# Patient Record
(demographics unavailable — no encounter records)

---

## 2024-10-28 NOTE — CARDIOLOGY SUMMARY
[de-identified] : - Atrial fibrillation: Onset 9/26/2024, [id number] Vasc score 4, Holter monitor:  10/23/2024, 3-day, A-fib 87 () no symptoms, 1% PVCs - Abnormal ECG - Hypertension - Hypercholesterolemia: 10 year cardiac risk 18%, 9/11/2021 - Diabetes - Morbid obesity: BMI 41 - Family history of premature coronary artery disease: Father MI 51 years old - PHARMACOLOGIC NUCLEAR STRESS TEST: 10/8/2024, normal, EF 70%   - ECHOCARDIOGRAM:10/3/2024, EF 55%, trace mitral regurgitation and tricuspid regurgitation PA systolic 38, mild aortic stenosis, BAUTISTA 1.7 cm, V1 = 0.92, V2 = 1.7, mean gradient 10, peak gradient 16  - CAROTID ULTRASOUND: 10/3/2024, normal

## 2024-10-28 NOTE — REASON FOR VISIT
[FreeTextEntry1] : Telephone telemedicine visit  chief complaint: Preoperative cardiovascular evaluation

## 2024-10-28 NOTE — HISTORY OF PRESENT ILLNESS
[FreeTextEntry1] : The patient is a 70-year-old white female with a past medical history remarkable for hypertension, hypercholesterolemia, morbid obesity, diabetes, a family history of premature coronary artery disease, and carotid artery disease who presented for evaluation on September 26 prior to endoscopy/colonoscopy. The patient was found to be in new onset atrial fibrillation. Holter monitoring demonstrated a controlled ventricular response.  Echocardiography demonstrated a normal ejection fraction and mild aortic stenosis.  Nuclear stress testing was normal. The patient's activity is limited by knee discomfort.  She ambulates with a cane.  She denies chest pain, dyspnea, palpitations, or loss of consciousness.   A basic metabolic profile from October 6 was reviewed.  As noted below, a CBC and TFTs were ordered I reviewed the results with the patient.  A plan was developed.  Her questions were answered.  We spoke on the phone for 5 minutes.  She consented to a telephone telemedicine visit

## 2024-10-28 NOTE — DISCUSSION/SUMMARY
[FreeTextEntry1] : Atrial fibrillation New onset.  Asymptomatic, rate controlled. Continue anticoagulation to prevent embolic complications of atrial fibrillation.  TFTs ordered.    hypertension Controlled.  Continue present medical regimen  hypercholesterolemia/morbid obesity elevated triglycerides 8/2024.  We rediscussed diet, exercise, and weight loss at the patient's next visit  Preoperative cardiovascular evaluation: Endoscopy/colonoscopy In view of the patient's normal ejection fraction, normal stress test, adequate functional capacity, and the absence of unstable angina, congestive heart failure, or severe aortic stenosis, she is at average risk for perioperative cardiovascular morbidity and mortality, and cleared from a cardiology standpoint with the following recommendations: Perioperative beta-blockers, endocarditis prophylaxis, and a postoperative monitored bed are not indicated. Eliquis may be held 2 days prior to the procedure  RTO 3 months

## 2025-02-05 NOTE — PHYSICAL EXAM

## 2025-02-05 NOTE — DISCUSSION/SUMMARY
[FreeTextEntry1] : Atrial fibrillation New onset 9/2024.  Asymptomatic, rate controlled. Continue anticoagulation to prevent embolic complications of atrial fibrillation.  EP consultation ordered to determine if the patient would benefit from atrial fibrillation ablation/implantable loop recorder     hypertension Controlled.  Continue present medical regimen  hypercholesterolemia/morbid obesity Low HDL cholesterol and elevated triglycerides 8/2024.   We rediscussed diet, exercise, and weight loss at the patient's next visit  RTO 6 months

## 2025-02-05 NOTE — HISTORY OF PRESENT ILLNESS
[FreeTextEntry1] : The patient is a 70-year-old white female with a past medical history remarkable for atrial fibrillation, hypertension, hypercholesterolemia, morbid obesity, diabetes, a family history of premature coronary artery disease, and carotid artery disease. The patient's activity is limited by knee discomfort.  She ambulates with a cane.  She denies chest pain, dyspnea, palpitations, or loss of consciousness.   Lipids from 8/14/2024 demonstrated hypertriglyceridemia and low HDL cholesterol.

## 2025-02-05 NOTE — CARDIOLOGY SUMMARY
[de-identified] : - Atrial fibrillation: Onset 9/26/2024, [id number] Vasc score 4, Holter monitor:  10/23/2024, 3-day, A-fib 87 () no symptoms, 1% PVCs - Abnormal ECG - Hypertension - Hypercholesterolemia: 10 year cardiac risk 18%, 9/11/2021 - Diabetes - Morbid obesity: BMI 41 - Family history of premature coronary artery disease: Father MI 51 years old - PHARMACOLOGIC NUCLEAR STRESS TEST: 10/8/2024, normal, EF 70%   - ECHOCARDIOGRAM:10/3/2024, EF 55%, trace mitral regurgitation and tricuspid regurgitation PA systolic 38, mild aortic stenosis, BAUTISTA 1.7 cm, V1 = 0.92, V2 = 1.7, mean gradient 10, peak gradient 16  - CAROTID ULTRASOUND: 10/3/2024, normal

## 2025-03-23 NOTE — PHYSICAL EXAM

## 2025-03-23 NOTE — PHYSICAL EXAM
[Well Developed] : well developed [Well Nourished] : well nourished [No Acute Distress] : no acute distress [Normal Conjunctiva] : normal conjunctiva [Normal Venous Pressure] : normal venous pressure [No Carotid Bruit] : no carotid bruit [No Murmur] : no murmur [No Rub] : no rub [No Gallop] : no gallop [Clear Lung Fields] : clear lung fields [Good Air Entry] : good air entry [No Respiratory Distress] : no respiratory distress  [Soft] : abdomen soft [Non Tender] : non-tender [No Masses/organomegaly] : no masses/organomegaly [Normal Bowel Sounds] : normal bowel sounds [Normal Gait] : normal gait [No Cyanosis] : no cyanosis [No Edema] : no edema [No Varicosities] : no varicosities [No Clubbing] : no clubbing [No Rash] : no rash [No Skin Lesions] : no skin lesions [Moves all extremities] : moves all extremities [No Focal Deficits] : no focal deficits [Normal Speech] : normal speech [Alert and Oriented] : alert and oriented [Normal memory] : normal memory

## 2025-03-27 NOTE — REASON FOR VISIT
[Arrhythmia/ECG Abnorrmalities] : arrhythmia/ECG abnormalities [FreeTextEntry1] : 69 yo F referred to discuss AF

## 2025-03-27 NOTE — HISTORY OF PRESENT ILLNESS
[FreeTextEntry1] : The patient is a 70-year-old white female with a past medical history remarkable for atrial fibrillation, hypertension, hypercholesterolemia, morbid obesity, diabetes, and carotid artery disease.  The patient's activity is limited by knee discomfort. She ambulates with a cane. She denies chest pain, dyspnea, palpitations, or loss of consciousness.  She was diagnosed with AF..... C2V=4, on Xarelto  Cardiology Summary     Other: - Atrial fibrillation: Onset 9/26/2024, [id number] Vasc score 4, Holter monitor: 10/23/2024, 3-day, A-fib 87 () no symptoms, 1% PVCs - Abnormal ECG - Hypertension - Hypercholesterolemia: 10 year cardiac risk 18%, 9/11/2021 - Diabetes - Morbid obesity: BMI 41 - Family history of premature coronary artery disease: Father MI 51 years old - PHARMACOLOGIC NUCLEAR STRESS TEST: 10/8/2024, normal, EF 70% - ECHOCARDIOGRAM:10/3/2024, EF 55%, trace mitral regurgitation and tricuspid regurgitation PA systolic 38, mild aortic stenosis, BAUTISTA 1.7 cm, V1 = 0.92, V2 = 1.7, mean gradient 10, peak gradient 16 - CAROTID ULTRASOUND: 10/3/2024, normal

## 2025-03-27 NOTE — DISCUSSION/SUMMARY
[EKG obtained to assist in diagnosis and management of assessed problem(s)] : EKG obtained to assist in diagnosis and management of assessed problem(s) [FreeTextEntry1] :  - We discussed the pathophysiology of atrial fibrillation and multiple management options. These include rate Vs rhythm control strategies. - Given the associated symptoms despite attempts at a rate control strategy/ reduced EF, I would recommend that efforts be directed to restoring and maintaining sinus rhythm. -For rhythm control, options include catheter ablation or medications, each with its own benefits and risks, which were reviewed in detail.   - Options include: a) antiarrhythmic medication b) ablation of atrial fibrillation   - We reviewed in detail the potential risks and benefits of ablative therapy for atrial fibrillation (including pulmonary vein isolation). - For paroxysmal***persistent atrial fibrillation, the success rate of a single procedure for achieving durable sinus rhythm is 60-70%***40-50%, and some patients have a better response to antiarrhythmic therapy after ablation. Success rates are often higher after a second or even third procedure. - The overall risk of any complications, including minor issues like groin hematoma not requiring intervention, is 2-3%. Serious complications like vascular injury requiring intervention are less frequent, and life-changing or potentially life-threatening complications such as cardiac perforation and tamponade, phrenic nerve or esophageal injury, stroke, or heart attack occur in less than 1% of patients. The use of newer pulsed-field ablation (PFA) technology likely reduces the risk of phrenic or esophageal injury even further, with shorter procedure times on average. - The overall risk is 2-3%, including the same risks as for flutter ablation, but a slightly higher risk of stroke and cardiac perforation and a small additional risk of serious injury such as phrenic nerve palsy or atrio-esophageal fistula.   After extensive discussion, the patient would like to ***.  We therefore agreed on the following plan:

## 2025-03-27 NOTE — HISTORY OF PRESENT ILLNESS
[FreeTextEntry1] : The patient is a 70-year-old white female with a past medical history remarkable for atrial fibrillation, hypertension, hypercholesterolemia, morbid obesity, diabetes, and carotid artery disease.  The patient's activity is limited by knee discomfort. She ambulates with a cane. She denies chest pain, dyspnea, palpitations, or loss of consciousness.  She was diagnosed with AF..... C2V=4, on Xarelto  Cardiology Summary     Other: - Atrial fibrillation: Onset 9/26/2024, [id number] Vasc score 4, Holter monitor: 10/23/2024, 3-day, A-fib 87 () no symptoms, 1% PVCs - Abnormal ECG - Hypertension - Hypercholesterolemia: 10 year cardiac risk 18%, 9/11/2021 - Diabetes - Morbid obesity: BMI 41 - Family history of premature coronary artery disease: Father MI 51 years old - PHARMACOLOGIC NUCLEAR STRESS TEST: 10/8/2024, normal, EF 70% - ECHOCARDIOGRAM:10/3/2024, EF 55%, trace mitral regurgitation and tricuspid regurgitation PA systolic 38, mild aortic stenosis, BAUTISTA 1.7 cm, V1 = 0.92, V2 = 1.7, mean gradient 10, peak gradient 16 - CAROTID ULTRASOUND: 10/3/2024, normal

## 2025-03-27 NOTE — REASON FOR VISIT
[Arrhythmia/ECG Abnorrmalities] : arrhythmia/ECG abnormalities [FreeTextEntry1] : 71 yo F referred to discuss AF

## 2025-05-05 NOTE — CARDIOLOGY SUMMARY
[de-identified] : Holter monitor: 10/23/2024, 3-day, A-fib 87 () no symptoms, 1% PVCs [de-identified] : PHARMACOLOGIC NUCLEAR STRESS TEST: 10/8/2024, normal, EF 70% [de-identified] : ECHOCARDIOGRAM:10/3/2024, EF 55%, trace mitral regurgitation and tricuspid regurgitation PA systolic 38, mild aortic stenosis, BAUTISTA 1.7 cm, V1 = 0.92, V2 = 1.7, mean gradient 10, peak gradient 16 [de-identified] : CAROTID ULTRASOUND: 10/3/2024, normal

## 2025-05-05 NOTE — CARDIOLOGY SUMMARY
[de-identified] : Holter monitor: 10/23/2024, 3-day, A-fib 87 () no symptoms, 1% PVCs [de-identified] : PHARMACOLOGIC NUCLEAR STRESS TEST: 10/8/2024, normal, EF 70% [de-identified] : ECHOCARDIOGRAM:10/3/2024, EF 55%, trace mitral regurgitation and tricuspid regurgitation PA systolic 38, mild aortic stenosis, BAUTISTA 1.7 cm, V1 = 0.92, V2 = 1.7, mean gradient 10, peak gradient 16 [de-identified] : CAROTID ULTRASOUND: 10/3/2024, normal

## 2025-05-05 NOTE — HISTORY OF PRESENT ILLNESS
[FreeTextEntry1] : 70F with pmhx atrial fibrillation on xarelto, hypertension, hypercholesterolemia, morbid obesity, diabetes, and carotid artery disease presents today for evaluation of AF.   She was diagnosed with AF incidentally on an ECG during an apt in fall 2024. She was and continues to be asymptomatic. Started on AC for stroke ppx, CHADS 4 (age, sex, HTN, DM). Ambulation limited 2/2 knee pain. She wore a Holter montior in 09/2024 which revealed 100% AF burden. TTE EF 55%, LA wnl. Pharm stress test wnl.   Pt offers no acute complaints. Denies chest pain, sob, ARRINGTON, palpitations, syncope, lightheadedness, dizziness. States that she does not feel when she is in AF. ECG today revealing of AF rate controlled.  Tolerating AC without any easy bleeding, bruising or falls.

## 2025-05-05 NOTE — DISCUSSION/SUMMARY
[FreeTextEntry1] : 70F with pmhx atrial fibrillation on xarelto, hypertension, hypercholesterolemia, morbid obesity, diabetes, and carotid artery disease presents today for evaluation of AF. She has likley persistent AF. We discussed management options including REGGIE/DCCV and potential long term benefits of restoration of NSR. WIll plan for REGGIE/DCCV and likely initiation of AADs thereafter as adjunct. If she feels well in SR, would be reasonable to consider an ablation. Continue xarelto for stroke ppx. No medication changes made today. F/u 1-2 months post cardioversion or sooner as needed.  [EKG obtained to assist in diagnosis and management of assessed problem(s)] : EKG obtained to assist in diagnosis and management of assessed problem(s)

## 2025-07-07 NOTE — CARDIOLOGY SUMMARY
[de-identified] : 07/07/25: AF 72 bpm  [de-identified] : Holter monitor: 10/23/2024, 3-day, A-fib 87 () no symptoms, 1% PVCs [de-identified] : PHARMACOLOGIC NUCLEAR STRESS TEST: 10/8/2024, normal, EF 70% [de-identified] : ECHOCARDIOGRAM:10/3/2024, EF 55%, trace mitral regurgitation and tricuspid regurgitation PA systolic 38, mild aortic stenosis, BAUTISTA 1.7 cm, V1 = 0.92, V2 = 1.7, mean gradient 10, peak gradient 16 [de-identified] : CAROTID ULTRASOUND: 10/3/2024, normal

## 2025-07-07 NOTE — HISTORY OF PRESENT ILLNESS
[FreeTextEntry1] : 70F with pmhx atrial fibrillation on xarelto, hypertension, hypercholesterolemia, morbid obesity, diabetes, and carotid artery disease presents today for evaluation of AF.   In summary, she was diagnosed with AF incidentally on an ECG during an apt in fall 2024. She was and continues to be asymptomatic. Started on AC for stroke ppx, CHADS 4 (age, sex, HTN, DM). Ambulation limited 2/2 knee pain. She wore a Holter montior in 09/2024 which revealed 100% AF burden. TTE EF 55%, LA wnl. Pharm stress test wnl.   She is now s/p yousif/dccv 06/10/25 with dr. boyd with restoration of SR initially, however she is noted to be back in AF today with a rate of 72 bpm despite amiodarone usage.   Pt offers no acute complaints. Denies chest pain, sob, ARRINGTON, palpitations, syncope, lightheadedness, dizziness. DId not feel any differently after YOUSIF/DCCV while in SR. EF at that time wnl.  Tolerating AC without significant bleeding concerns, bruising or falls.

## 2025-07-07 NOTE — DISCUSSION/SUMMARY
[FreeTextEntry1] : 70F with pmhx atrial fibrillation on xarelto, hypertension, hypercholesterolemia, morbid obesity, diabetes, and carotid artery disease presents today for f/u of AF.   She underwent REGGIE/DCCV 06/10/25 with Dr. Mckeon and was started on amiodarone, however, is back in AF despite amio as adjunct. She does not have symptoms/did not feel differently after cardioversion in SR. EF has been wnl. Amiodarone discontinued. If she develops symptoms or drop in EF would reconsider more aggressive rhythm control. Will have patient repeat TTE and f/u in six months or sooner as needed.  [EKG obtained to assist in diagnosis and management of assessed problem(s)] : EKG obtained to assist in diagnosis and management of assessed problem(s)

## 2025-07-08 NOTE — DISCUSSION/SUMMARY
[FreeTextEntry1] : Atrial fibrillation New onset 9/2024.  Asymptomatic, rate controlled. Continue anticoagulation to prevent embolic complications of atrial fibrillation.  Status post failed REGGIE /DCCV 6/10/2025.   At a later date we will discuss atrial fibrillation ablation.  The patient requested an alternative electrophysiologist.       hypertension Controlled.  Continue present medical regimen  hypercholesterolemia/morbid obesity Low HDL cholesterol and elevated triglycerides 8/2024.   We rediscussed diet, exercise, and weight loss at the patient's next visit  Preoperative cardiovascular evaluation: Rotator cuff repair In view of the patient's normal ejection fraction, normal stress test, functional capacity, and the absence of unstable angina, congestive heart failure, or severe aortic stenosis, she is at average risk for perioperative cardiovascular morbidity and mortality, and cleared from a cardiology standpoint with the following recommendations: Perioperative beta-blockers, endocarditis prophylaxis, and a postoperative monitored bed are not indicated. Xarelto may be held 2 days prior to the procedure and restarted postoperatively. DVT prophylaxis at the surgeon's discretion.  RTO 3 months

## 2025-07-08 NOTE — CARDIOLOGY SUMMARY
[de-identified] : 7/7/2025: Atrial fibrillation, poor R wave progression [de-identified] : - Atrial fibrillation: Onset 9/26/2024, [id number] Vasc score 4, Holter monitor:  10/23/2024, 3-day, A-fib 87 () no symptoms, 1% PVCs, failed REGGIE/DCC 6/10/2025.  - Abnormal ECG - Hypertension - Hypercholesterolemia: 10 year cardiac risk 18%, 9/11/2021 - Diabetes - Morbid obesity: BMI 41 - Family history of premature coronary artery disease: Father MI 51 years old - PHARMACOLOGIC NUCLEAR STRESS TEST: 10/8/2024, normal, EF 70%   - ECHOCARDIOGRAM:10/3/2024, EF 55%, trace mitral regurgitation and tricuspid regurgitation PA systolic 38, mild aortic stenosis, BAUTISTA 1.7 cm, V1 = 0.92, V2 = 1.7, mean gradient 10, peak gradient 16  - CAROTID ULTRASOUND: 10/3/2024, normal

## 2025-07-08 NOTE — HISTORY OF PRESENT ILLNESS
[FreeTextEntry1] : The patient is a 70-year-old white female with a past medical history remarkable for atrial fibrillation, hypertension, hypercholesterolemia, morbid obesity, diabetes, a family history of premature coronary artery disease, and carotid artery disease who presents for evaluation prior to rotator cuff repair. The patient's activity is limited by knee discomfort.  She ambulates with a cane.  She denies chest pain, dyspnea, palpitations, or loss of consciousness.   Lipids from 8/14/2024 demonstrated hypertriglyceridemia and low HDL cholesterol.  Her TSH was low 111/2/2024. The patient underwent unsuccessful REGGIE /DCCV for atrial fibrillation.

## 2025-07-08 NOTE — PHYSICAL EXAM
[Well Developed] : well developed [Well Nourished] : well nourished [No Acute Distress] : no acute distress [Normal Conjunctiva] : normal conjunctiva [Normal Venous Pressure] : normal venous pressure [No Carotid Bruit] : no carotid bruit [Normal S1, S2] : normal S1, S2 [No Rub] : no rub [No Gallop] : no gallop [Murmur] : murmur [Clear Lung Fields] : clear lung fields [Good Air Entry] : good air entry [No Respiratory Distress] : no respiratory distress  [Soft] : abdomen soft [Non Tender] : non-tender [No Masses/organomegaly] : no masses/organomegaly [Normal Bowel Sounds] : normal bowel sounds [Normal Gait] : normal gait [No Cyanosis] : no cyanosis [No Clubbing] : no clubbing [No Varicosities] : no varicosities [No Rash] : no rash [No Skin Lesions] : no skin lesions [Moves all extremities] : moves all extremities [No Focal Deficits] : no focal deficits [Normal Speech] : normal speech [Alert and Oriented] : alert and oriented [Normal memory] : normal memory [de-identified] : 1/6 systolic murmur [de-identified] : Irregular rhythm [de-identified] : Nonpitting ankle edema